# Patient Record
Sex: FEMALE | Race: WHITE | URBAN - METROPOLITAN AREA
[De-identification: names, ages, dates, MRNs, and addresses within clinical notes are randomized per-mention and may not be internally consistent; named-entity substitution may affect disease eponyms.]

---

## 2019-04-25 ENCOUNTER — TRANSFERRED RECORDS (OUTPATIENT)
Dept: HEALTH INFORMATION MANAGEMENT | Facility: CLINIC | Age: 24
End: 2019-04-25

## 2019-05-29 ENCOUNTER — TRANSFERRED RECORDS (OUTPATIENT)
Dept: HEALTH INFORMATION MANAGEMENT | Facility: CLINIC | Age: 24
End: 2019-05-29

## 2019-05-29 ENCOUNTER — MEDICAL CORRESPONDENCE (OUTPATIENT)
Dept: HEALTH INFORMATION MANAGEMENT | Facility: CLINIC | Age: 24
End: 2019-05-29

## 2019-05-30 ENCOUNTER — ANCILLARY PROCEDURE (OUTPATIENT)
Dept: CARDIOLOGY | Facility: CLINIC | Age: 24
End: 2019-05-30
Attending: FAMILY MEDICINE
Payer: COMMERCIAL

## 2019-05-30 DIAGNOSIS — R42 DIZZINESSES: ICD-10-CM

## 2020-02-10 ENCOUNTER — OFFICE VISIT (OUTPATIENT)
Dept: FAMILY MEDICINE | Facility: CLINIC | Age: 25
End: 2020-02-10
Payer: COMMERCIAL

## 2020-02-10 VITALS
TEMPERATURE: 98.6 F | SYSTOLIC BLOOD PRESSURE: 112 MMHG | HEART RATE: 82 BPM | WEIGHT: 150.4 LBS | HEIGHT: 68 IN | DIASTOLIC BLOOD PRESSURE: 66 MMHG | BODY MASS INDEX: 22.79 KG/M2

## 2020-02-10 DIAGNOSIS — R22.9 LUMP OF SKIN: Primary | ICD-10-CM

## 2020-02-10 PROCEDURE — 99203 OFFICE O/P NEW LOW 30 MIN: CPT | Performed by: FAMILY MEDICINE

## 2020-02-10 ASSESSMENT — MIFFLIN-ST. JEOR: SCORE: 1476.22

## 2020-02-10 NOTE — PROGRESS NOTES
"Subjective     Nimisha Sloan is a 24 year old female who presents to clinic today for the following health issues:    HPI     Check hard lump back of neck at the hair line.  1 week   Sore neck for the past few months   No infections   She has some cyst/s in the scalp   No pain   No fever   No glands in any typical locations     Father  from leukemia when he was 60.     Reviewed and updated as needed this visit by Provider               Objective    /66 (BP Location: Right arm, Patient Position: Sitting, Cuff Size: Adult Regular)   Pulse 82   Temp 98.6  F (37  C) (Oral)   Ht 1.72 m (5' 7.72\")   Wt 68.2 kg (150 lb 6.4 oz)   BMI 23.06 kg/m    Body mass index is 23.06 kg/m .  Physical Exam   GENERAL: healthy, alert and no distress  EYES: Eyes grossly normal to inspection, PERRL and conjunctivae and sclerae normal  HENT: ear canals and TM's normal, nose and mouth without ulcers or lesions  NECK: no adenopathy, no asymmetry, masses, or scars and thyroid normal to palpation  SKIN: no suspicious lesions or rashes  Pea size nodule in the neck below the left occipital area       ICD-10-CM    1. Lump of skin R22.9            "

## 2025-01-23 ENCOUNTER — APPOINTMENT (OUTPATIENT)
Dept: CT IMAGING | Facility: CLINIC | Age: 30
End: 2025-01-23
Attending: EMERGENCY MEDICINE
Payer: COMMERCIAL

## 2025-01-23 ENCOUNTER — HOSPITAL ENCOUNTER (EMERGENCY)
Facility: CLINIC | Age: 30
Discharge: HOME | End: 2025-01-23
Attending: EMERGENCY MEDICINE
Payer: COMMERCIAL

## 2025-01-23 VITALS
RESPIRATION RATE: 16 BRPM | HEIGHT: 69 IN | HEART RATE: 100 BPM | BODY MASS INDEX: 25.18 KG/M2 | OXYGEN SATURATION: 99 % | WEIGHT: 170 LBS | DIASTOLIC BLOOD PRESSURE: 71 MMHG | SYSTOLIC BLOOD PRESSURE: 112 MMHG | TEMPERATURE: 97.8 F

## 2025-01-23 DIAGNOSIS — R10.84 GENERALIZED ABDOMINAL PAIN: ICD-10-CM

## 2025-01-23 LAB
ALBUMIN SERPL BCG-MCNC: 4.5 G/DL (ref 3.5–5.2)
ALP SERPL-CCNC: 43 U/L (ref 40–150)
ALT SERPL W P-5'-P-CCNC: 10 U/L (ref 0–50)
ANION GAP SERPL CALCULATED.3IONS-SCNC: 11 MMOL/L (ref 7–15)
AST SERPL W P-5'-P-CCNC: 16 U/L (ref 0–45)
BASOPHILS # BLD AUTO: 0.1 10E3/UL (ref 0–0.2)
BASOPHILS NFR BLD AUTO: 1 %
BILIRUB SERPL-MCNC: 0.5 MG/DL
BUN SERPL-MCNC: 12.7 MG/DL (ref 6–20)
CALCIUM SERPL-MCNC: 9.3 MG/DL (ref 8.8–10.4)
CHLORIDE SERPL-SCNC: 103 MMOL/L (ref 98–107)
CREAT SERPL-MCNC: 0.83 MG/DL (ref 0.51–0.95)
EGFRCR SERPLBLD CKD-EPI 2021: >90 ML/MIN/1.73M2
EOSINOPHIL # BLD AUTO: 0.1 10E3/UL (ref 0–0.7)
EOSINOPHIL NFR BLD AUTO: 1 %
ERYTHROCYTE [DISTWIDTH] IN BLOOD BY AUTOMATED COUNT: 13.2 % (ref 10–15)
GLUCOSE SERPL-MCNC: 81 MG/DL (ref 70–99)
HCG SERPL QL: NEGATIVE
HCO3 SERPL-SCNC: 24 MMOL/L (ref 22–29)
HCT VFR BLD AUTO: 44.6 % (ref 35–47)
HGB BLD-MCNC: 15.2 G/DL (ref 11.7–15.7)
IMM GRANULOCYTES # BLD: 0 10E3/UL
IMM GRANULOCYTES NFR BLD: 0 %
LIPASE SERPL-CCNC: 39 U/L (ref 13–60)
LYMPHOCYTES # BLD AUTO: 1.5 10E3/UL (ref 0.8–5.3)
LYMPHOCYTES NFR BLD AUTO: 25 %
MCH RBC QN AUTO: 29.6 PG (ref 26.5–33)
MCHC RBC AUTO-ENTMCNC: 34.1 G/DL (ref 31.5–36.5)
MCV RBC AUTO: 87 FL (ref 78–100)
MONOCYTES # BLD AUTO: 0.4 10E3/UL (ref 0–1.3)
MONOCYTES NFR BLD AUTO: 7 %
NEUTROPHILS # BLD AUTO: 3.8 10E3/UL (ref 1.6–8.3)
NEUTROPHILS NFR BLD AUTO: 65 %
NRBC # BLD AUTO: 0 10E3/UL
NRBC BLD AUTO-RTO: 0 /100
PLATELET # BLD AUTO: 221 10E3/UL (ref 150–450)
POTASSIUM SERPL-SCNC: 4.6 MMOL/L (ref 3.4–5.3)
PROT SERPL-MCNC: 7.8 G/DL (ref 6.4–8.3)
RBC # BLD AUTO: 5.13 10E6/UL (ref 3.8–5.2)
SODIUM SERPL-SCNC: 138 MMOL/L (ref 135–145)
WBC # BLD AUTO: 5.8 10E3/UL (ref 4–11)

## 2025-01-23 PROCEDURE — 85041 AUTOMATED RBC COUNT: CPT | Performed by: EMERGENCY MEDICINE

## 2025-01-23 PROCEDURE — 250N000011 HC RX IP 250 OP 636: Performed by: EMERGENCY MEDICINE

## 2025-01-23 PROCEDURE — 82247 BILIRUBIN TOTAL: CPT | Performed by: EMERGENCY MEDICINE

## 2025-01-23 PROCEDURE — 83690 ASSAY OF LIPASE: CPT | Performed by: EMERGENCY MEDICINE

## 2025-01-23 PROCEDURE — 74177 CT ABD & PELVIS W/CONTRAST: CPT

## 2025-01-23 PROCEDURE — 82040 ASSAY OF SERUM ALBUMIN: CPT | Performed by: EMERGENCY MEDICINE

## 2025-01-23 PROCEDURE — 99284 EMERGENCY DEPT VISIT MOD MDM: CPT | Performed by: EMERGENCY MEDICINE

## 2025-01-23 PROCEDURE — 36415 COLL VENOUS BLD VENIPUNCTURE: CPT | Performed by: EMERGENCY MEDICINE

## 2025-01-23 PROCEDURE — 99285 EMERGENCY DEPT VISIT HI MDM: CPT | Mod: 25 | Performed by: EMERGENCY MEDICINE

## 2025-01-23 PROCEDURE — 74177 CT ABD & PELVIS W/CONTRAST: CPT | Mod: 26 | Performed by: STUDENT IN AN ORGANIZED HEALTH CARE EDUCATION/TRAINING PROGRAM

## 2025-01-23 PROCEDURE — 250N000009 HC RX 250: Performed by: EMERGENCY MEDICINE

## 2025-01-23 PROCEDURE — 85004 AUTOMATED DIFF WBC COUNT: CPT | Performed by: EMERGENCY MEDICINE

## 2025-01-23 PROCEDURE — 84703 CHORIONIC GONADOTROPIN ASSAY: CPT | Performed by: EMERGENCY MEDICINE

## 2025-01-23 RX ORDER — IOPAMIDOL 755 MG/ML
104 INJECTION, SOLUTION INTRAVASCULAR ONCE
Status: COMPLETED | OUTPATIENT
Start: 2025-01-23 | End: 2025-01-23

## 2025-01-23 RX ORDER — BUSPIRONE HYDROCHLORIDE 5 MG/1
5 TABLET ORAL ONCE
COMMUNITY

## 2025-01-23 RX ADMIN — IOPAMIDOL 104 ML: 755 INJECTION, SOLUTION INTRAVENOUS at 12:25

## 2025-01-23 RX ADMIN — SODIUM CHLORIDE, PRESERVATIVE FREE 76 ML: 5 INJECTION INTRAVENOUS at 12:27

## 2025-01-23 ASSESSMENT — ACTIVITIES OF DAILY LIVING (ADL)
ADLS_ACUITY_SCORE: 41

## 2025-01-23 ASSESSMENT — COLUMBIA-SUICIDE SEVERITY RATING SCALE - C-SSRS
1. IN THE PAST MONTH, HAVE YOU WISHED YOU WERE DEAD OR WISHED YOU COULD GO TO SLEEP AND NOT WAKE UP?: NO
2. HAVE YOU ACTUALLY HAD ANY THOUGHTS OF KILLING YOURSELF IN THE PAST MONTH?: NO
6. HAVE YOU EVER DONE ANYTHING, STARTED TO DO ANYTHING, OR PREPARED TO DO ANYTHING TO END YOUR LIFE?: NO

## 2025-01-23 NOTE — ED PROVIDER NOTES
"ED Provider Note  [facility]      History     Chief Complaint   Patient presents with    Abdominal Pain     Pt ambulatory to the ED for c/o \"stabbing pain to the belly button\" with additional c/o nausea and fatigue for the last 2 hours. Per pt \"the pain get worse when I cough\".     HPI  Nimisha Sloan is a 29 year old female who presents to the emergency department with abdominal pain.  Patient reports \"stabbing pain to her navel\" with addition of nausea and fatigue for the last 2 hours.  She states the pain gets worse when coughing as well.***    Past Medical History  History reviewed. No pertinent past medical history.  History reviewed. No pertinent surgical history.  busPIRone (BUSPAR) 5 MG tablet  Multiple Vitamin (MULTI-VITAMIN DAILY PO)      No Known Allergies  Family History  No family history on file.  Social History   Social History     Tobacco Use    Smoking status: Never    Smokeless tobacco: Never   Substance Use Topics    Alcohol use: Yes     Comment: Soc    Drug use: Never      A medically appropriate review of systems was performed with pertinent positives and negatives noted in the HPI, and all other systems negative.    Physical Exam   BP: 139/74  Pulse: (!) 114  Temp: 97.8  F (36.6  C)  Resp: 16  Height: 175.3 cm (5' 9\")  Weight: 77.1 kg (170 lb)  SpO2: 97 %  Physical Exam  Vitals and nursing note reviewed.   Constitutional:       General: She is not in acute distress.     Appearance: Normal appearance. She is not diaphoretic.   HENT:      Head: Atraumatic.      Mouth/Throat:      Mouth: Mucous membranes are moist.   Eyes:      General: No scleral icterus.     Conjunctiva/sclera: Conjunctivae normal.   Cardiovascular:      Rate and Rhythm: Normal rate.      Heart sounds: Normal heart sounds.   Pulmonary:      Effort: No respiratory distress.      Breath sounds: Normal breath sounds.   Abdominal:      General: Abdomen is flat.   Musculoskeletal:      Cervical back: " "Neck supple.   Skin:     General: Skin is warm.      Findings: No rash.   Neurological:      Mental Status: She is alert.       ***    ED Course, Procedures, & Data      Procedures       {ED Course Selections (Optional):102523}  {ED Sepsis CMS Documentation (Optional):259696::\" \"}       No results found for any visits on 01/23/25.  Medications - No data to display  Labs Ordered and Resulted from Time of ED Arrival to Time of ED Departure - No data to display  No orders to display          {Critical Care Performed?:834296}    Assessment & Plan    ***    I have reviewed the nursing notes. I have reviewed the findings, diagnosis, plan and need for follow up with the patient.    New Prescriptions    No medications on file       Final diagnoses:   None   I, Ellen Orourke, am serving as a trained medical scribe to document services personally performed by Saman Flowers MD, based on the provider's statements to me.     Saman MCCORMICK MD, was physically present and have reviewed and verified the accuracy of this note documented by Ellen Orourke.     Saman Flowers MD  [facility] EMERGENCY DEPARTMENT  1/23/2025  " hydronephrosis.    Gastrointestinal tract :Normal appendix. Normal small bowel. No  abnormal thickening of small and large bowel loops.    Mesentery/peritoneum/retroperitoneum: No mass. Trace free fluid in the  rectouterine pouch, presumably physiologic. No free air.    Lymph nodes: No significant lymphadenopathy. Mildly enlarged lymph  node near the cecum measuring up to 1.0 cm in short axis (4/233),  presumably reactive.    Vasculature: Patent major abdominal vasculature. Portal veins are  patent.    Pelvis: Urinary bladder is normal. Prominent endometrium measuring up  to 17 mm. Suspect arcuate uterus. Heterogeneous myometrium, possibly  due to fibroids. Thick-walled cystic focus in the right ovary  measuring 1.9 x 2.2 cm (4/318). Fat density focus in the right ovary  measuring 1.1 x 1.0 cm (4/310). Pelvic phleboliths.    Osseous structures: No aggressive or acute osseous lesion.      Soft tissues: Small fat containing umbilical hernia.      Impression    IMPRESSION:   1. No acute intra-abdominal findings to explain the patient's  symptoms.  2. Thick-walled cystic focus and likely separate fat-containing focus  in the right ovary, possibly representing a corpus luteal cyst and  dermoid cyst respectively. If clinically desired, a pelvic ultrasound  could be obtained for further assessment/characterization.  3. Prominent endometrium, nonspecific, could be within normal limits  depending on the stage of the patient's menstrual cycle.     I have personally reviewed the examination and initial interpretation  and I agree with the findings.    ANÍBAL HORTA DO         SYSTEM ID:  [id number]   Comprehensive metabolic panel     Status: Normal   Result Value Ref Range    Sodium 138 135 - 145 mmol/L    Potassium 4.6 3.4 - 5.3 mmol/L    Carbon Dioxide (CO2) 24 22 - 29 mmol/L    Anion Gap 11 7 - 15 mmol/L    Urea Nitrogen 12.7 6.0 - 20.0 mg/dL    Creatinine 0.83 0.51 - 0.95 mg/dL    GFR Estimate >90 >60 mL/min/1.73m2    Calcium  9.3 8.8 - 10.4 mg/dL    Chloride 103 98 - 107 mmol/L    Glucose 81 70 - 99 mg/dL    Alkaline Phosphatase 43 40 - 150 U/L    AST 16 0 - 45 U/L    ALT 10 0 - 50 U/L    Protein Total 7.8 6.4 - 8.3 g/dL    Albumin 4.5 3.5 - 5.2 g/dL    Bilirubin Total 0.5 <=1.2 mg/dL   Lipase     Status: Normal   Result Value Ref Range    Lipase 39 13 - 60 U/L   HCG qualitative pregnancy (blood)     Status: Normal   Result Value Ref Range    hCG Serum Qualitative Negative Negative   CBC with platelets and differential     Status: None   Result Value Ref Range    WBC Count 5.8 4.0 - 11.0 10e3/uL    RBC Count 5.13 3.80 - 5.20 10e6/uL    Hemoglobin 15.2 11.7 - 15.7 g/dL    Hematocrit 44.6 35.0 - 47.0 %    MCV 87 78 - 100 fL    MCH 29.6 26.5 - 33.0 pg    MCHC 34.1 31.5 - 36.5 g/dL    RDW 13.2 10.0 - 15.0 %    Platelet Count 221 150 - 450 10e3/uL    % Neutrophils 65 %    % Lymphocytes 25 %    % Monocytes 7 %    % Eosinophils 1 %    % Basophils 1 %    % Immature Granulocytes 0 %    NRBCs per 100 WBC 0 <1 /100    Absolute Neutrophils 3.8 1.6 - 8.3 10e3/uL    Absolute Lymphocytes 1.5 0.8 - 5.3 10e3/uL    Absolute Monocytes 0.4 0.0 - 1.3 10e3/uL    Absolute Eosinophils 0.1 0.0 - 0.7 10e3/uL    Absolute Basophils 0.1 0.0 - 0.2 10e3/uL    Absolute Immature Granulocytes 0.0 <=0.4 10e3/uL    Absolute NRBCs 0.0 10e3/uL   CBC with Platelets & Differential     Status: None    Narrative    The following orders were created for panel order CBC with Platelets & Differential.  Procedure                               Abnormality         Status                     ---------                               -----------         ------                     CBC with platelets and d...[[id number]]                      Final result                 Please view results for these tests on the individual orders.     Medications   iopamidol (ISOVUE-370) solution 104 mL (104 mLs Intravenous $Given 1/23/25 1229)   Saline Flush (76 mLs Intravenous $Given 1/23/25 1797)     Labs  Ordered and Resulted from Time of ED Arrival to Time of ED Departure   COMPREHENSIVE METABOLIC PANEL - Normal       Result Value    Sodium 138      Potassium 4.6      Carbon Dioxide (CO2) 24      Anion Gap 11      Urea Nitrogen 12.7      Creatinine 0.83      GFR Estimate >90      Calcium 9.3      Chloride 103      Glucose 81      Alkaline Phosphatase 43      AST 16      ALT 10      Protein Total 7.8      Albumin 4.5      Bilirubin Total 0.5     LIPASE - Normal    Lipase 39     HCG QUALITATIVE PREGNANCY - Normal    hCG Serum Qualitative Negative     CBC WITH PLATELETS AND DIFFERENTIAL    WBC Count 5.8      RBC Count 5.13      Hemoglobin 15.2      Hematocrit 44.6      MCV 87      MCH 29.6      MCHC 34.1      RDW 13.2      Platelet Count 221      % Neutrophils 65      % Lymphocytes 25      % Monocytes 7      % Eosinophils 1      % Basophils 1      % Immature Granulocytes 0      NRBCs per 100 WBC 0      Absolute Neutrophils 3.8      Absolute Lymphocytes 1.5      Absolute Monocytes 0.4      Absolute Eosinophils 0.1      Absolute Basophils 0.1      Absolute Immature Granulocytes 0.0      Absolute NRBCs 0.0       CT Abdomen Pelvis w Contrast   Final Result   IMPRESSION:    1. No acute intra-abdominal findings to explain the patient's   symptoms.   2. Thick-walled cystic focus and likely separate fat-containing focus   in the right ovary, possibly representing a corpus luteal cyst and   dermoid cyst respectively. If clinically desired, a pelvic ultrasound   could be obtained for further assessment/characterization.   3. Prominent endometrium, nonspecific, could be within normal limits   depending on the stage of the patient's menstrual cycle.       I have personally reviewed the examination and initial interpretation   and I agree with the findings.      ANÍBAL HORTA DO            SYSTEM ID:  [id number]             Critical care was not performed.     Medical Decision Making  The patient's presentation was of moderate  "complexity (an undiagnosed new problem with uncertain prognosis).    The patient's evaluation involved:  review of external note(s) from 1 sources (see separate area of note for details)  review of 3+ test result(s) ordered prior to this encounter (see separate area of note for details)  ordering and/or review of 3+ test(s) in this encounter (see separate area of note for details)    The patient's management necessitated only low risk treatment.    Assessment & Plan    Nimisha Sloan is a 29 year old female who presents to the emergency department with abdominal pain.  Patient reports \"stabbing pain to her navel\" with addition of nausea and fatigue for the last 2 hours.  Patient is nontoxic-appearing on exam, his vital signs within normal limits.  She does have some tenderness in the midepigastric region without rebound or guarding.  No additional focal tenderness.  She is tachycardic initially in triage which resolved with IV fluids and was regular rhythm.  Lab work obtained as above including CBC, pregnancy test, comprehensive metabolic panel, and lipase.  Given the patient's acute and severe pain she was additionally worked up with CT abdomen pelvis which was negative for acute pathology to explain the patient's symptoms.  She did not have any right lower quadrant left lower quadrant tenderness, adnexal tenderness, or pelvic complaints including no pelvic discharge or bleeding.  No urinary complaints of dysuria.  No concerning fevers, chills, or other concerns at this time.  Patient had a reassuring evaluation overall was discharged with outpatient follow-up and return precautions.    I have reviewed the nursing notes. I have reviewed the findings, diagnosis, plan and need for follow up with the patient.    Discharge Medication List as of 1/23/2025  2:11 PM          Final diagnoses:   Generalized abdominal pain   IEllen, am serving as a trained medical scribe to document services personally " performed by Saman Flowers MD, based on the provider's statements to me.     I, Saman Flowers MD, was physically present and have reviewed and verified the accuracy of this note documented by Ellen Orourke.     Saman Flowers MD  [facility] EMERGENCY DEPARTMENT  1/23/2025     Saman Flowers MD  02/03/25 [id number]

## 2025-01-23 NOTE — ED TRIAGE NOTES
"Pt ambulatory to the ED for c/o \"stabbing pain to the belly button\" with additional c/o nausea and fatigue for the last 2 hours. Per pt \"the pain get worse when I cough\".     Triage Assessment (Adult)       Row Name 01/23/25 0824          Triage Assessment    Airway WDL WDL        Respiratory WDL    Respiratory WDL WDL        Skin Circulation/Temperature WDL    Skin Circulation/Temperature WDL WDL        Cardiac WDL    Cardiac WDL WDL        Peripheral/Neurovascular WDL    Peripheral Neurovascular WDL WDL        Cognitive/Neuro/Behavioral WDL    Cognitive/Neuro/Behavioral WDL WDL                     "

## 2025-02-15 ENCOUNTER — HEALTH MAINTENANCE LETTER (OUTPATIENT)
Age: 30
End: 2025-02-15